# Patient Record
Sex: MALE | Race: WHITE | NOT HISPANIC OR LATINO
[De-identification: names, ages, dates, MRNs, and addresses within clinical notes are randomized per-mention and may not be internally consistent; named-entity substitution may affect disease eponyms.]

---

## 2021-08-01 ENCOUNTER — TRANSCRIPTION ENCOUNTER (OUTPATIENT)
Age: 41
End: 2021-08-01

## 2021-08-20 ENCOUNTER — TRANSCRIPTION ENCOUNTER (OUTPATIENT)
Age: 41
End: 2021-08-20

## 2021-08-23 ENCOUNTER — TRANSCRIPTION ENCOUNTER (OUTPATIENT)
Age: 41
End: 2021-08-23

## 2021-08-27 ENCOUNTER — TRANSCRIPTION ENCOUNTER (OUTPATIENT)
Age: 41
End: 2021-08-27

## 2021-12-26 ENCOUNTER — TRANSCRIPTION ENCOUNTER (OUTPATIENT)
Age: 41
End: 2021-12-26

## 2021-12-27 ENCOUNTER — APPOINTMENT (OUTPATIENT)
Dept: OTOLARYNGOLOGY | Facility: CLINIC | Age: 41
End: 2021-12-27
Payer: COMMERCIAL

## 2021-12-27 VITALS — TEMPERATURE: 93.5 F | WEIGHT: 200 LBS | HEIGHT: 75 IN | BODY MASS INDEX: 24.87 KG/M2

## 2021-12-27 DIAGNOSIS — H90.5 UNSPECIFIED SENSORINEURAL HEARING LOSS: ICD-10-CM

## 2021-12-27 DIAGNOSIS — H92.09 OTALGIA, UNSPECIFIED EAR: ICD-10-CM

## 2021-12-27 DIAGNOSIS — H93.11 TINNITUS, RIGHT EAR: ICD-10-CM

## 2021-12-27 PROBLEM — Z00.00 ENCOUNTER FOR PREVENTIVE HEALTH EXAMINATION: Status: ACTIVE | Noted: 2021-12-27

## 2021-12-27 PROCEDURE — 99203 OFFICE O/P NEW LOW 30 MIN: CPT

## 2021-12-27 PROCEDURE — 92557 COMPREHENSIVE HEARING TEST: CPT

## 2021-12-27 PROCEDURE — 92567 TYMPANOMETRY: CPT

## 2022-01-04 ENCOUNTER — RESULT REVIEW (OUTPATIENT)
Age: 42
End: 2022-01-04

## 2022-01-04 ENCOUNTER — APPOINTMENT (OUTPATIENT)
Dept: MRI IMAGING | Facility: CLINIC | Age: 42
End: 2022-01-04
Payer: COMMERCIAL

## 2022-01-04 ENCOUNTER — OUTPATIENT (OUTPATIENT)
Dept: OUTPATIENT SERVICES | Facility: HOSPITAL | Age: 42
LOS: 1 days | End: 2022-01-04

## 2022-01-04 PROCEDURE — 70551 MRI BRAIN STEM W/O DYE: CPT | Mod: 26

## 2022-01-05 NOTE — HISTORY OF PRESENT ILLNESS
[de-identified] : RADHA REYNOLDS is a 41 year old male who comes in complaining of 7-10 days of right-sided otalgia and feeling as if he is under watch her. He was seen in urgent care and they did not find any pathology. He has not had any recent upper respiratory tract infection or sore throat.The patient had no other ear nose or throat complaints at this visit.\par He says that he has a previous history of a right-sided asymmetric sensorineural hearing loss without imaging

## 2022-01-05 NOTE — ASSESSMENT
[FreeTextEntry1] : It was my impression that he has an aural fullness but I did not see evidence of middle ear or or external ear disease nor oral or pharyngeal radiation.\par Although he did not seem \par  to have much temporomandibular joint dysfunction, that is probably the etiology.\par I recommend warm compresses and anti-inflammatory\par However, because of the asymmetry, I suggested following up with imaging of the internal auditory canals.\par I will see him back for further laryngoscopy if the symptoms did not respond.

## 2022-01-05 NOTE — PHYSICAL EXAM
[FreeTextEntry1] : \par The patient was alert and oriented and in no distress.\par Voice was clear.\par \par Face:\par The patient had no facial asymmetry or mass.\par The skin was unremarkable.\par \par Eyes:\par The pupils were equal round and reactive to light and accommodation.\par There was no significant nystagmus or disconjugate gaze noted.\par \par Nose: \par The external nose had no significant deformity.  There was no facial tenderness.  On anterior rhinoscopy, the nasal mucosa was clear.  The anterior septum was midline.  There were no visualized polyps purulence  or masses.\par \par Oral cavity:\par The oral mucosa was normal.\par The oral and base of tongue were clear and without mass.\par The gingival and buccal mucosa were moist and without lesions.\par The palate moved well.\par There was no cleft to the palate.\par There appeared to be good salivary flow.  \par There was no pus, erythema or mass in the oral cavity.\par \par \par Ears:\par The external ears were normal without deformity.\par The ear canals were clear.\par The tympanic membranes were intact and normal.\par \par Neck: \par The neck was symmetrical.\par The parotid and submandibular glands were normal without masses.\par The trachea was midline and there was no unusual crepitus.\par The thyroid was smooth and nontender and no masses were palpated.\par There was no significant cervical adenopathy.\par \par \par Neuro:\par Neurologically, the patient was awake, alert, and oriented to person, place and time. There were no obvious focal neurologic abnormalities.  Cranial nerves II through XII were grossly intact.\par \par \par TMJ:\par The temporomandibular joints were nontender.\par There was no abnormal crepitus and no significant malocclusion\par \par  [de-identified] : Complete audiometric evaluation was ordered and done and reviewed with the patient at length. We also discussed his son's hearing losses\par This showed normal hearing to the speech frequencies with a mild asymmetry above in the right ear

## 2024-05-29 ENCOUNTER — RESULT REVIEW (OUTPATIENT)
Age: 44
End: 2024-05-29